# Patient Record
Sex: FEMALE | Race: WHITE | NOT HISPANIC OR LATINO | Employment: FULL TIME | URBAN - METROPOLITAN AREA
[De-identification: names, ages, dates, MRNs, and addresses within clinical notes are randomized per-mention and may not be internally consistent; named-entity substitution may affect disease eponyms.]

---

## 2020-06-22 ENCOUNTER — OCCUPATIONAL HEALTH (OUTPATIENT)
Dept: URGENT CARE | Facility: CLINIC | Age: 55
End: 2020-06-22

## 2020-06-22 DIAGNOSIS — Z02.83 ENCOUNTER FOR DRUG SCREENING: Primary | ICD-10-CM

## 2020-06-22 PROCEDURE — 80305 DRUG TEST PRSMV DIR OPT OBS: CPT | Mod: S$GLB,,, | Performed by: FAMILY MEDICINE

## 2020-06-22 PROCEDURE — 80305 OOH NON-DOT DRUG SCREEN: ICD-10-PCS | Mod: S$GLB,,, | Performed by: FAMILY MEDICINE

## 2020-09-14 ENCOUNTER — OCCUPATIONAL HEALTH (OUTPATIENT)
Dept: URGENT CARE | Facility: CLINIC | Age: 55
End: 2020-09-14

## 2020-09-14 VITALS
TEMPERATURE: 98 F | BODY MASS INDEX: 25.48 KG/M2 | HEART RATE: 65 BPM | HEIGHT: 64 IN | RESPIRATION RATE: 18 BRPM | WEIGHT: 149.25 LBS | OXYGEN SATURATION: 98 %

## 2020-09-14 DIAGNOSIS — Z20.822 EXPOSURE TO COVID-19 VIRUS: Primary | ICD-10-CM

## 2020-09-14 LAB
CTP QC/QA: YES
SARS-COV-2 RDRP RESP QL NAA+PROBE: NEGATIVE

## 2020-09-14 PROCEDURE — 99199 UNLISTED SPECIAL SVC PX/RPRT: CPT | Mod: S$GLB,,, | Performed by: PHYSICIAN ASSISTANT

## 2020-09-14 PROCEDURE — U0002 COVID-19 LAB TEST NON-CDC: HCPCS | Mod: S$GLB,,, | Performed by: PHYSICIAN ASSISTANT

## 2020-09-14 PROCEDURE — U0002: ICD-10-PCS | Mod: S$GLB,,, | Performed by: PHYSICIAN ASSISTANT

## 2020-09-14 PROCEDURE — 99199 CV19 SPECIMEN HANDLING FEE / SWAB: ICD-10-PCS | Mod: S$GLB,,, | Performed by: PHYSICIAN ASSISTANT

## 2020-09-14 NOTE — PROGRESS NOTES

## 2022-12-27 ENCOUNTER — OCCUPATIONAL HEALTH (OUTPATIENT)
Dept: URGENT CARE | Facility: CLINIC | Age: 57
End: 2022-12-27

## 2022-12-27 DIAGNOSIS — Z13.9 ENCOUNTER FOR SCREENING: Primary | ICD-10-CM

## 2022-12-27 LAB
CTP QC/QA: YES
POC 10 PANEL DRUG SCREEN: NEGATIVE

## 2022-12-27 PROCEDURE — 80305 DRUG TEST PRSMV DIR OPT OBS: CPT | Mod: S$GLB,,, | Performed by: EMERGENCY MEDICINE

## 2022-12-27 PROCEDURE — 80305 POCT RAPID DRUG SCREEN 10 PANEL: ICD-10-PCS | Mod: S$GLB,,, | Performed by: EMERGENCY MEDICINE

## 2024-02-21 ENCOUNTER — OFFICE VISIT (OUTPATIENT)
Dept: URGENT CARE | Facility: CLINIC | Age: 59
End: 2024-02-21
Payer: COMMERCIAL

## 2024-02-21 VITALS
WEIGHT: 150 LBS | RESPIRATION RATE: 18 BRPM | HEART RATE: 88 BPM | TEMPERATURE: 98 F | OXYGEN SATURATION: 100 % | BODY MASS INDEX: 25.61 KG/M2 | DIASTOLIC BLOOD PRESSURE: 60 MMHG | SYSTOLIC BLOOD PRESSURE: 108 MMHG | HEIGHT: 64 IN

## 2024-02-21 DIAGNOSIS — J01.00 ACUTE NON-RECURRENT MAXILLARY SINUSITIS: ICD-10-CM

## 2024-02-21 DIAGNOSIS — R05.9 COUGH, UNSPECIFIED TYPE: Primary | ICD-10-CM

## 2024-02-21 DIAGNOSIS — U07.1 COVID-19: ICD-10-CM

## 2024-02-21 LAB
CTP QC/QA: YES
FLUAV AG NPH QL: NEGATIVE
FLUBV AG NPH QL: NEGATIVE
S PYO RRNA THROAT QL PROBE: NEGATIVE
SARS-COV-2 AG RESP QL IA.RAPID: POSITIVE

## 2024-02-21 PROCEDURE — 87880 STREP A ASSAY W/OPTIC: CPT | Mod: QW,,, | Performed by: STUDENT IN AN ORGANIZED HEALTH CARE EDUCATION/TRAINING PROGRAM

## 2024-02-21 PROCEDURE — 87804 INFLUENZA ASSAY W/OPTIC: CPT | Mod: QW,,, | Performed by: STUDENT IN AN ORGANIZED HEALTH CARE EDUCATION/TRAINING PROGRAM

## 2024-02-21 PROCEDURE — 87811 SARS-COV-2 COVID19 W/OPTIC: CPT | Mod: QW,S$GLB,, | Performed by: STUDENT IN AN ORGANIZED HEALTH CARE EDUCATION/TRAINING PROGRAM

## 2024-02-21 PROCEDURE — 99204 OFFICE O/P NEW MOD 45 MIN: CPT | Mod: S$GLB,,, | Performed by: STUDENT IN AN ORGANIZED HEALTH CARE EDUCATION/TRAINING PROGRAM

## 2024-02-21 RX ORDER — DEXBROMPHENIRAMINE MALEATE, DEXTROMETHORPHAN HYDROBROMIDE AND PHENYLEPHRINE HYDROCHLORIDE 2; 15; 7.5 MG/5ML; MG/5ML; MG/5ML
5 LIQUID ORAL 4 TIMES DAILY PRN
Qty: 118 ML | Refills: 0 | Status: SHIPPED | OUTPATIENT
Start: 2024-02-21

## 2024-02-21 RX ORDER — ONDANSETRON 4 MG/1
4 TABLET, ORALLY DISINTEGRATING ORAL EVERY 6 HOURS PRN
Qty: 20 TABLET | Refills: 0 | Status: SHIPPED | OUTPATIENT
Start: 2024-02-21

## 2024-02-21 RX ORDER — AZITHROMYCIN 250 MG/1
TABLET, FILM COATED ORAL
Qty: 6 TABLET | Refills: 0 | Status: SHIPPED | OUTPATIENT
Start: 2024-02-21 | End: 2024-02-26

## 2024-02-21 NOTE — PROGRESS NOTES
"Subjective:      Patient ID: Sara Ribera is a 58 y.o. female.    Vitals:  height is 5' 4" (1.626 m) and weight is 68 kg (150 lb). Her temperature is 97.9 °F (36.6 °C). Her blood pressure is 108/60 and her pulse is 88. Her respiration is 18 and oxygen saturation is 100%.     Chief Complaint: Sore Throat    Patient is a 58-year-old female who presents to clinic for evaluation of sore throat and URI related symptoms.  Patient reports symptoms began approximately 3 days ago.  Patient reports over-the-counter aspirin with mild relief to symptoms.  Patient reports she is feeling better today than she did yesterday.  Patient reports she is not vaccinated.  Patient reports positive sick exposure as she has worked around COVID positive patients.    Sore Throat   This is a new problem. The current episode started in the past 7 days (x's 2 days). The problem has been gradually worsening. Associated symptoms include congestion, coughing, headaches and vomiting. Pertinent negatives include no abdominal pain, diarrhea, drooling, ear pain or shortness of breath. Associated symptoms comments: myalgia. Treatments tried: ASA. The treatment provided mild relief.       Constitution: Positive for chills, fatigue and fever (Temperature max 99° F).   HENT:  Positive for congestion, postnasal drip, sinus pressure and sore throat. Negative for ear pain and drooling.    Neck: neck negative.   Cardiovascular: Negative.  Negative for chest pain and palpitations.   Eyes: Negative.    Respiratory:  Positive for cough and sputum production. Negative for chest tightness, shortness of breath and wheezing.    Gastrointestinal:  Positive for nausea and vomiting. Negative for abdominal pain and diarrhea.   Endocrine: negative.   Genitourinary: Negative.  Negative for dysuria, frequency and urgency.   Musculoskeletal:  Positive for muscle ache.   Skin: Negative.  Negative for color change, pale, rash and erythema.   Allergic/Immunologic: " Negative.    Neurological:  Positive for headaches. Negative for dizziness, light-headedness, passing out, disorientation and altered mental status.   Hematologic/Lymphatic: Negative.    Psychiatric/Behavioral: Negative.  Negative for altered mental status, disorientation and confusion.       Objective:     Physical Exam   Constitutional: She is oriented to person, place, and time. She appears well-developed. She is cooperative.  Non-toxic appearance. She appears ill. No distress.   HENT:   Head: Normocephalic and atraumatic.   Ears:   Right Ear: Hearing, tympanic membrane, external ear and ear canal normal.   Left Ear: Hearing, tympanic membrane, external ear and ear canal normal.   Nose: Congestion present. No mucosal edema, rhinorrhea or nasal deformity. No epistaxis. Right sinus exhibits maxillary sinus tenderness. Right sinus exhibits no frontal sinus tenderness. Left sinus exhibits maxillary sinus tenderness. Left sinus exhibits no frontal sinus tenderness.   Mouth/Throat: Uvula is midline and mucous membranes are normal. Mucous membranes are moist. No trismus in the jaw. Normal dentition. No uvula swelling. Posterior oropharyngeal erythema present. No oropharyngeal exudate. Oropharynx is clear.   Eyes: Conjunctivae and lids are normal. Pupils are equal, round, and reactive to light. Right eye exhibits no discharge. Left eye exhibits no discharge. No scleral icterus.   Neck: Trachea normal and phonation normal. Neck supple. No neck rigidity present.   Cardiovascular: Normal rate, regular rhythm, normal heart sounds and normal pulses.   Pulmonary/Chest: Effort normal and breath sounds normal. No respiratory distress (Unlabored.  Equal rise and fall of chest.  Able speak in full complete sentences.  No adventitious breath sounds noted.). She has no wheezes. She has no rhonchi. She has no rales.   Abdominal: Normal appearance and bowel sounds are normal. She exhibits no distension. Soft. There is no abdominal  tenderness. There is no rebound, no guarding, no left CVA tenderness and no right CVA tenderness.   Musculoskeletal: Normal range of motion.         General: Normal range of motion.      Cervical back: She exhibits no tenderness.   Lymphadenopathy:     She has no cervical adenopathy.   Neurological: She is alert and oriented to person, place, and time. She exhibits normal muscle tone.   Skin: Skin is warm, dry, intact, not diaphoretic, not pale and no rash. Capillary refill takes less than 2 seconds. No erythema   Psychiatric: Her speech is normal and behavior is normal. Judgment and thought content normal.   Nursing note and vitals reviewed.      Assessment:     1. Cough, unspecified type    2. COVID-19    3. Acute non-recurrent maxillary sinusitis        Plan:       Cough, unspecified type  -     SARS Coronavirus 2 Antigen, POCT Manual Read  -     POCT Influenza A/B Rapid Antigen  -     POCT rapid strep A    COVID-19    Acute non-recurrent maxillary sinusitis    Other orders  -     azithromycin (Z-TAWANA) 250 MG tablet; Take 2 tablets by mouth on day 1; Take 1 tablet by mouth on days 2-5  Dispense: 6 tablet; Refill: 0  -     dexbrompheniramine-phenylep-DM (POLYTUSSIN DM,DEXBROMPHENIRMN,) 2-7.5-15 mg/5 mL Liqd; Take 5 mLs by mouth 4 (four) times daily as needed (Cough/congestion).  Dispense: 118 mL; Refill: 0  -     ondansetron (ZOFRAN-ODT) 4 MG TbDL; Take 1 tablet (4 mg total) by mouth every 6 (six) hours as needed (Nausea).  Dispense: 20 tablet; Refill: 0                Labs:  COVID positive.  Influenza a and B negative.  Rapid strep negative.  Quarantine as directed.  Quarantine information provided to patient.  COVID recommendations provided.  (Vitamin-C 2000 mg daily, vitamin D3 1000 IU daily, Pepcid 40 mg daily, Zyrtec 10 mg daily, zinc 50 mg daily)  Tylenol per package instructions for any pain or fever.  May rotate with Motrin if unable to control pain or fever along with Tylenol.  Monitor home SpO2 and  present to emergency department with readings less than 92%.  Take medications as prescribed.  Discussed oral antiviral medication with patient.  Patient refuses.  Assure adequate hydration.  Follow-up with PCP in 1-2 days.  Return to clinic as needed.  To ED for any new or acutely worsening symptoms including but not limited to chest pain, palpitations, shortness of breath, or fever greater than 103° F.  Patient in agreement with plan of care.    DISCLAIMER: Please note that my documentation in this Electronic Healthcare Record was produced using speech recognition software and therefore may contain errors related to that software system.These could include grammar, punctuation and spelling errors or the inclusion/exclusion of phrases that were not intended. Garbled syntax, mangled pronouns, and other bizarre constructions may be attributed to that software system.

## 2024-02-21 NOTE — LETTER
February 21, 2024      [facility] [place]  [address]  [address]  [place] [address]  Phone: [phone]  Fax: [phone]       Patient: Sara Ribera   YOB: 1965  Date of Visit: 02/21/2024    To Whom It May Concern:    Carmen Ribera  was at [facility] on 02/21/2024. The patient may return to work/school on 02/23/2024 with restrictions (5 days of mask wearing to complete 10 day CDC quarantine guideline). If you have any questions or concerns, or if I can be of further assistance, please do not hesitate to contact me.    Sincerely,    Bryan Kelley NP